# Patient Record
Sex: MALE | Race: WHITE
[De-identification: names, ages, dates, MRNs, and addresses within clinical notes are randomized per-mention and may not be internally consistent; named-entity substitution may affect disease eponyms.]

---

## 2019-08-16 ENCOUNTER — HOSPITAL ENCOUNTER (OUTPATIENT)
Dept: HOSPITAL 47 - ORWHC2ENDO | Age: 49
Discharge: HOME | End: 2019-08-16
Attending: INTERNAL MEDICINE
Payer: COMMERCIAL

## 2019-08-16 VITALS — SYSTOLIC BLOOD PRESSURE: 125 MMHG | HEART RATE: 64 BPM | DIASTOLIC BLOOD PRESSURE: 85 MMHG

## 2019-08-16 VITALS — BODY MASS INDEX: 29.5 KG/M2

## 2019-08-16 VITALS — RESPIRATION RATE: 16 BRPM

## 2019-08-16 VITALS — TEMPERATURE: 97.8 F

## 2019-08-16 DIAGNOSIS — Z91.030: ICD-10-CM

## 2019-08-16 DIAGNOSIS — K62.1: ICD-10-CM

## 2019-08-16 DIAGNOSIS — Z12.11: Primary | ICD-10-CM

## 2019-08-16 DIAGNOSIS — F17.210: ICD-10-CM

## 2019-08-16 DIAGNOSIS — R03.0: ICD-10-CM

## 2019-08-16 PROCEDURE — 45385 COLONOSCOPY W/LESION REMOVAL: CPT

## 2019-08-16 PROCEDURE — 88305 TISSUE EXAM BY PATHOLOGIST: CPT

## 2019-08-16 NOTE — P.PCN
Date of Procedure: 08/16/19


Procedure(s) Performed: 


BRIEF HISTORY: Patient is a 48-year-old pleasant white male scheduled for an 

elective colonoscopy as a part of screening for colorectal neoplasia.





PROCEDURE PERFORMED: Colonoscopy with snare polypectomy. 





PREOPERATIVE DIAGNOSIS: Screening for colon cancer. 





IV sedation per Anesthesia. 





PROCEDURE: After informed consent was obtained, the patient, was brought into 

the endoscopy unit. IV sedation was administered by Anesthesia under continuous 

monitoring.  Digital rectal examination was normal. Initially the Olympus CF-160

flexible video colonoscope was then inserted in the rectum, gradually advanced 

into the cecum without any difficulty. Careful examination was performed as the 

scope was gradually being withdrawn. Ileocecal valve and the appendiceal orifice

were visualized and appeared normal.  Prep was excellent. Mucosa of the cecum, 

ascending colon, transverse colon, descending colon, sigmoid colon, and rectum 

appeared normal.  In the mid rectum there was a 5 limited polyp that was removed

by snare polypectomy.  Retroflexion was performed in the rectum and no lesions 

were seen. The patient tolerated the procedure well. 





IMPRESSION: 


5 mm mid rectal polyp serous was snare polypectomy





RECOMMENDATIONS:  Findings of this examination were discussed with the patient 

well as his family.  He was advised to follow with the biopsy results.  If the 

biopsy shows an adenoma he can have a repeat colonoscopy in 5 years